# Patient Record
Sex: MALE | URBAN - NONMETROPOLITAN AREA
[De-identification: names, ages, dates, MRNs, and addresses within clinical notes are randomized per-mention and may not be internally consistent; named-entity substitution may affect disease eponyms.]

---

## 2021-09-09 ENCOUNTER — HISTORICAL (OUTPATIENT)
Dept: ADMINISTRATIVE | Facility: HOSPITAL | Age: 26
End: 2021-09-09

## 2022-04-11 LAB
AGE: 29
AGE: 29
ALBUMIN SERPL-MCNC: 4.5 G/DL (ref 3.4–5)
ALBUMIN SERPL-MCNC: 5 G/DL (ref 3.4–5)
ALBUMIN/GLOB SERPL: 1.7 {RATIO}
ALBUMIN/GLOB SERPL: 1.7 {RATIO}
ALP SERPL-CCNC: 81 U/L (ref 50–144)
ALP SERPL-CCNC: 92 U/L (ref 50–144)
ALT SERPL-CCNC: 17 U/L (ref 1–45)
ALT SERPL-CCNC: 23 U/L (ref 1–45)
ANION GAP SERPL CALC-SCNC: 5 MMOL/L (ref 2–13)
ANION GAP SERPL CALC-SCNC: 8 MMOL/L (ref 2–13)
AST SERPL-CCNC: 23 U/L (ref 17–59)
AST SERPL-CCNC: 26 U/L (ref 17–59)
BASOPHILS # BLD AUTO: 0.06 10*3/UL (ref 0.01–0.08)
BASOPHILS # BLD AUTO: 0.06 10*3/UL (ref 0.01–0.08)
BASOPHILS NFR BLD AUTO: 0.6 % (ref 0.1–1.2)
BASOPHILS NFR BLD AUTO: 1 % (ref 0.1–1.2)
BILIRUB SERPL-MCNC: 0.49 MG/DL (ref 0–1)
BILIRUB SERPL-MCNC: 0.86 MG/DL (ref 0–1)
BUN SERPL-MCNC: 12 MG/DL (ref 7–20)
BUN SERPL-MCNC: 13 MG/DL (ref 7–20)
CALCIUM SERPL-MCNC: 9.1 MG/DL (ref 8.4–10.2)
CALCIUM SERPL-MCNC: 9.2 MG/DL (ref 8.4–10.2)
CHLORIDE SERPL-SCNC: 104 MMOL/L (ref 98–110)
CHLORIDE SERPL-SCNC: 104 MMOL/L (ref 98–110)
CHOLEST SERPL-MCNC: 221 MG/DL (ref 0–200)
CO2 SERPL-SCNC: 28 MMOL/L (ref 21–32)
CO2 SERPL-SCNC: 30 MMOL/L (ref 21–32)
CREAT SERPL-MCNC: 0.97 MG/DL (ref 0.66–1.25)
CREAT SERPL-MCNC: 0.98 MG/DL (ref 0.66–1.25)
CREAT/UREA NIT SERPL: 12.4 (ref 12–20)
CREAT/UREA NIT SERPL: 13.3 (ref 12–20)
DEPRECATED CALCIDIOL+CALCIFEROL SERPL-MC: <20 NG/ML (ref 30–100)
EOSINOPHIL # BLD AUTO: 0.08 10*3/UL (ref 0.04–0.54)
EOSINOPHIL # BLD AUTO: 0.27 10*3/UL (ref 0.04–0.54)
EOSINOPHIL NFR BLD AUTO: 0.8 % (ref 0.7–7)
EOSINOPHIL NFR BLD AUTO: 4.4 % (ref 0.7–7)
ERYTHROCYTE [DISTWIDTH] IN BLOOD BY AUTOMATED COUNT: 11.3 % (ref 11.6–14.4)
ERYTHROCYTE [DISTWIDTH] IN BLOOD BY AUTOMATED COUNT: 11.6 % (ref 11.6–14.4)
EST. AVERAGE GLUCOSE BLD GHB EST-MCNC: 90 MG/DL (ref 70–115)
GLOBULIN SER-MCNC: 2.6 G/DL (ref 2–3.9)
GLOBULIN SER-MCNC: 3 G/DL (ref 2–3.9)
GLUCOSE SERPL-MCNC: 109 MG/DL (ref 70–115)
GLUCOSE SERPL-MCNC: 95 MG/DL (ref 70–115)
HBA1C MFR BLD: 5 % (ref 4–6)
HCT VFR BLD AUTO: 42.3 % (ref 36–52)
HCT VFR BLD AUTO: 45.8 % (ref 36–52)
HDLC SERPL-MCNC: 56 MG/DL (ref 40–60)
HGB BLD-MCNC: 15.1 G/DL (ref 13–18)
HGB BLD-MCNC: 16.2 G/DL (ref 13–18)
IMM GRANULOCYTES # BLD AUTO: 0.01 10*3/UL (ref 0–0.03)
IMM GRANULOCYTES # BLD AUTO: 0.02 10*3/UL (ref 0–0.03)
IMM GRANULOCYTES NFR BLD AUTO: 0.2 % (ref 0–0.5)
IMM GRANULOCYTES NFR BLD AUTO: 0.2 % (ref 0–0.5)
LDLC SERPL CALC-MCNC: 103.2 MG/DL (ref 30–100)
LYMPHOCYTES # BLD AUTO: 2.74 10*3/UL (ref 1.32–3.57)
LYMPHOCYTES # BLD AUTO: 2.75 10*3/UL (ref 1.32–3.57)
LYMPHOCYTES NFR BLD AUTO: 28 % (ref 20–55)
LYMPHOCYTES NFR BLD AUTO: 44.9 % (ref 20–55)
MANUAL DIFF? (OHS): NORMAL
MANUAL DIFF? (OHS): NORMAL
MCH RBC QN AUTO: 31.5 PG (ref 27–34)
MCH RBC QN AUTO: 31.6 PG (ref 27–34)
MCHC RBC AUTO-ENTMCNC: 35.4 G/DL (ref 31–37)
MCHC RBC AUTO-ENTMCNC: 35.7 G/DL (ref 31–37)
MCV RBC AUTO: 88.1 FL (ref 79–99)
MCV RBC AUTO: 89.5 FL (ref 79–99)
MONOCYTES # BLD AUTO: 0.4 10*3/UL (ref 0.3–0.82)
MONOCYTES # BLD AUTO: 0.47 10*3/UL (ref 0.3–0.82)
MONOCYTES NFR BLD AUTO: 4.8 % (ref 4.7–12.5)
MONOCYTES NFR BLD AUTO: 6.5 % (ref 4.7–12.5)
NEUTROPHILS # BLD AUTO: 2.64 10*3/UL (ref 1.78–5.38)
NEUTROPHILS # BLD AUTO: 6.41 10*3/UL (ref 1.78–5.38)
NEUTROPHILS NFR BLD AUTO: 43 % (ref 37–73)
NEUTROPHILS NFR BLD AUTO: 65.6 % (ref 37–73)
PLATELET # BLD AUTO: 224 10*3/UL (ref 140–371)
PLATELET # BLD AUTO: 286 10*3/UL (ref 140–371)
PMV BLD AUTO: 10.9 FL (ref 9.4–12.4)
PMV BLD AUTO: 9.8 FL (ref 9.4–12.4)
POTASSIUM SERPL-SCNC: 4.2 MMOL/L (ref 3.5–5.1)
POTASSIUM SERPL-SCNC: 4.5 MMOL/L (ref 3.5–5.1)
PROT SERPL-MCNC: 7.1 G/DL (ref 6.3–8.2)
PROT SERPL-MCNC: 8 G/DL (ref 6.3–8.2)
RBC # BLD AUTO: 4.8 10*6/UL (ref 4–6)
RBC # BLD AUTO: 5.12 10*6/UL (ref 4–6)
SODIUM SERPL-SCNC: 139 MMOL/L (ref 135–145)
SODIUM SERPL-SCNC: 140 MMOL/L (ref 135–145)
T4 FREE SERPL-MCNC: 0.9 NG/DL (ref 0.78–2.19)
TESTOST SERPL-MCNC: 192 NG/DL (ref 132–813)
TRIGL SERPL-MCNC: 251 MG/DL (ref 30–200)
TSH SERPL-ACNC: 3.73 M[IU]/L (ref 0.36–3.74)
WBC # SPEC AUTO: 6.1 10*3/UL (ref 4–11.5)
WBC # SPEC AUTO: 9.8 10*3/UL (ref 4–11.5)

## 2022-05-16 ENCOUNTER — HISTORICAL (OUTPATIENT)
Dept: ADMINISTRATIVE | Facility: HOSPITAL | Age: 27
End: 2022-05-16

## 2022-05-21 NOTE — HISTORICAL [facility] CERNER
This is a historical note converted from [employer]. Formatting and pictures may have been removed.  Please reference [employer] for original formatting and attached multimedia. Chief Complaint  new patient anxiety/ depression  History of Present Illness  26 y/o WM here to establish care. He was seen previously by Dr Paz. Has PMH of arthritis, generalized anxiety and MDD, smokes #PPD, smokes?marijuana daily and meth?occasionally.  ?  He has dealt with some degree of depression and anxiety since he was a young child. He was on medications previously, Lexapro, Zoloft, Prozac in past and those did not work. ?In the recent months he has not felt particularly depressed?but is having?an issue with feeling?aggravated?easily. ?I believe the source of his agitation?has to do with his current job. ?It is affecting his home life, wife has been complaining about it and has recently?left him?for period of time, she has since returned?and urged him to get some help.??He recently tried a friends Alprazolam and felt that it was great. ?He does not sleep well at night.? States that he feels chronically?tired. ?He denies snoring or episodes of apnea.  ?  He has an issue with chronic back pain,?it affects his entire back at different times. ?His cervical neck pain is?intermittent, does not affect range of motion, but when it flares up it radiates?particularly to the left arm and causes numbness and tingling?in the left?fingers. 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 ?He has been taking over-the-counter medications for relief?and it does help. He does not radiation to the lower extremities. ?No loss of bowel or bladder function, no loss of sensory or motor function.  ?  He has limited knowledge on his familys medical history.  Review of Systems  Constitutional:?no fever, fatigue  Eye:?no vision changes, eye redness,  drainage, or pain  ENMT:?no sore throat, ear pain, sinus pain/congestion, nasal congestion/drainage  Respiratory:?no cough, no wheezing, no shortness of breath  Cardiovascular:?no chest pain, no palpitations, no edema  Gastrointestinal:?no nausea, vomiting, diarrhea, or?abdominal pain  Genitourinary:?no dysuria, no urinary frequency or urgency, no hematuria  Hema/Lymph:?no abnormal bruising or bleeding  Endocrine:?no heat or cold intolerance, no excessive thirst or excessive urination  Musculoskeletal:?no muscle or joint pain, no joint swelling  Integumentary:?no skin rash or abnormal lesion  Neurologic: no headache, no dizziness  ?  Physical Exam  Vitals & Measurements  T:?37.2? ?C (Temporal Artery)? HR:?75(Peripheral)? BP:?120/60? SpO2:?96%?  HT:?180.00?cm? WT:?98.500?kg? BMI:?30.4?  General:?AAOx3, in no acute distress  Eye: clear conjunctiva, eyelids normal  HENT:?TMs/ear canals clear, oropharynx without erythema/exudate  Neck: no thyromegaly or lymphadenopathy  Respiratory:?clear to auscultation bilaterally  Cardiovascular:?regular rate and rhythm without murmurs  Gastrointestinal:?soft, non-tender, with normal bowel sounds?  Integumentary: no rashes present, no peripheral edema  Musculoskeletal: full ROM of all extremities; no vertebral tenderness; +RT paravertebral tenderness lower c-spine; Equal hand ;?steady gait  Neurologic: Cranial nerves grossly intact as tested, no sign of peripheral neurological deficit, motor/sensory function intact; NVI  ?  ?  Assessment/Plan  1.?Encounter to establish care ? Z76.89  Scheduled fasting labs  2.?Cervical radiculopathy, chronic ? M54.12  Ibuprofen 800mg PRN; do not take in conjunction with other NSAIDs (Aleve, Advil, etc)  Take Cyclobenzaprine 10mg PRN  3.?Chronic lower back pain ? M54.50  4.?Generalized anxiety disorder ? F41.1  Start Effexor 37.5mg daily  RTC in 3 weeks for follow up  5.?Fatigue ? R53.83  Labs pending  Will see if Effexor helps him to rest; will  add additional medication if needed a that time  Try Melatonin at bedtime; practice good sleep hygeine  6.?Obesity with body mass index (BMI) of 30.0 to 39.9 ? E66.9  ?dvise?dietary adjustments?and implementation?of routine exercise?in order to promote weight loss  7.?Lipid screening ? Z13.220  8.?Diabetes mellitus screening ? Z13.1  9.?Thyroid disorder screen ? Z13.29  10.?Tobacco user ? Z72.0  ?  *3 week follow up/Anxiety&depression  ?  Patient verbalizes understanding of plan and agrees.? Call w any additional questions, concerns, or issues.? RTC prn or as?above.?   Problem List/Past Medical History  Ongoing  Cervical radiculopathy, chronic  Chronic lower back pain  Fatigue  Generalized anxiety disorder  Obesity with body mass index (BMI) of 30.0 to 39.9  Historical  Obesity  Medications  cyclobenzaprine 10 mg oral tablet, 10 mg= 1 tab(s), Oral, TID, PRN  Effexor XR 37.5 mg oral capsule, extended release, 37.5 mg= 1 cap(s), Oral, Daily, 1 refills  ibuprofen 800 mg oral tablet, 800 mg= 1 tab(s), Oral, TID, PRN, 1 refills  Allergies  Darvocet A500?(Hives)  Ultram?(Hives)  Social History  Abuse/Neglect  No, No, Yes, 04/11/2022  Alcohol  Current, Liquor, 1-2 times per month, Alcohol use interferes with work or home: No. Others hurt by drinking: No. Household alcohol concerns: No., 04/11/2022  Substance Use  Past, Methamphetamines, 04/11/2022  Tobacco  10 or more cigarettes (1/2 pack or more)/day in last 30 days, Cigarettes, N/A, 60 per day. Household tobacco concerns: No. Smokeless Tobacco Use: Never. 24 Years (Age started)., 04/11/2022  Family History  Diabetes mellitus type 2: Mother.  Hypertension.: Mother.  Health Maintenance  Health Maintenance  ???Pending?(in the next year)  ??? ??OverDue  ??? ? ? ?Smoking Cessation due??01/01/22??Variable frequency  ??? ? ? ?Alcohol Misuse Screening due??01/02/22??and every 1??year(s)  ??? ??Due?  ??? ? ? ?ADL Screening due??04/11/22??and every 1??year(s)  ??? ? ? ?Tetanus  Vaccine due??04/11/22??and every 10??year(s)  ??? ??Due In Future?  ??? ? ? ?Obesity Screening not due until??01/01/23??and every 1??year(s)  ???Satisfied?(in the past 1 year)  ??? ??Satisfied?  ??? ? ? ?Blood Pressure Screening on??04/11/22.??Satisfied by Nghia ALEJANDRO, Piquela P.  ??? ? ? ?Body Mass Index Check on??04/11/22.??Satisfied by Nghia ALEJANDRO, Piquela P.  ??? ? ? ?Depression Screening on??04/11/22.??Satisfied by Nghia ALEJANDRO, Piquela P.  ??? ? ? ?Obesity Screening on??04/11/22.??Satisfied by Nghia ALEJANDRO, Piquela P.  ?

## 2022-06-20 ENCOUNTER — HISTORICAL (OUTPATIENT)
Dept: ADMINISTRATIVE | Facility: HOSPITAL | Age: 27
End: 2022-06-20